# Patient Record
Sex: FEMALE | Race: WHITE | NOT HISPANIC OR LATINO | URBAN - METROPOLITAN AREA
[De-identification: names, ages, dates, MRNs, and addresses within clinical notes are randomized per-mention and may not be internally consistent; named-entity substitution may affect disease eponyms.]

---

## 2019-10-30 ENCOUNTER — OFFICE VISIT (OUTPATIENT)
Dept: URBAN - METROPOLITAN AREA CLINIC 23 | Facility: CLINIC | Age: 84
End: 2019-10-30
Payer: MEDICARE

## 2019-10-30 DIAGNOSIS — H35.3134 NEXDTVE AGE-REL MCLR DEGN, BI, ADV ATRPC WITH SBFVL INVOLV: Primary | ICD-10-CM

## 2019-10-30 PROCEDURE — 92004 COMPRE OPH EXAM NEW PT 1/>: CPT | Performed by: OPTOMETRIST

## 2019-10-30 PROCEDURE — 92014 COMPRE OPH EXAM EST PT 1/>: CPT | Performed by: OPTOMETRIST

## 2019-10-30 PROCEDURE — 92134 CPTRZ OPH DX IMG PST SGM RTA: CPT | Performed by: OPTOMETRIST

## 2019-10-30 ASSESSMENT — KERATOMETRY
OS: 42.38
OD: 41.75

## 2019-10-30 ASSESSMENT — INTRAOCULAR PRESSURE
OD: 21
OS: 21

## 2019-10-30 NOTE — IMPRESSION/PLAN
Impression: Nexdtve age-rel mclr degn, bi, adv atrpc with sbfvl involv: [id number] OU. Plan: Discussed diagnosis in detail with patient. Advised and emphasized patient of blood sugar control. Discussed risks of progression. Poor compliance can lead to blindness. OCT macula performed and reviewed with patient today. Reassured patient of condition and treatment. Will continue to observe condition and or symptoms.

## 2020-01-01 ENCOUNTER — OFFICE VISIT (OUTPATIENT)
Dept: URBAN - METROPOLITAN AREA CLINIC 23 | Facility: CLINIC | Age: 85
End: 2020-01-01
Payer: MEDICARE

## 2020-01-01 DIAGNOSIS — E11.9 TYPE 2 DIABETES MELLITUS W/O COMPLICATION: Primary | ICD-10-CM

## 2020-01-01 PROCEDURE — 92134 CPTRZ OPH DX IMG PST SGM RTA: CPT | Performed by: OPTOMETRIST

## 2020-01-01 PROCEDURE — 92014 COMPRE OPH EXAM EST PT 1/>: CPT | Performed by: OPTOMETRIST

## 2020-01-01 ASSESSMENT — KERATOMETRY
OD: 42.25
OS: 42.25

## 2020-01-01 ASSESSMENT — INTRAOCULAR PRESSURE
OS: 20
OD: 20

## 2020-10-28 NOTE — IMPRESSION/PLAN
Impression: Nexdtve age-rel mclr degn, bi, adv atrpc with sbfvl involv: [id number] OU. Plan: Macular degeneration, dry type, appears progressed with decreased vision. Will continue to observe.

## 2020-10-28 NOTE — IMPRESSION/PLAN
Impression: Type 2 diabetes mellitus w/o complication: N54.6 OU. Plan: Discussed diagnosis in detail with patient. Advised and emphasized to the patient about blood sugar control. Discussed risks of progression and poor compliance can lead to blindness. Will continue to observe condition and/or symptoms.